# Patient Record
Sex: MALE | Race: OTHER | HISPANIC OR LATINO
[De-identification: names, ages, dates, MRNs, and addresses within clinical notes are randomized per-mention and may not be internally consistent; named-entity substitution may affect disease eponyms.]

---

## 2021-01-12 ENCOUNTER — TRANSCRIPTION ENCOUNTER (OUTPATIENT)
Age: 31
End: 2021-01-12

## 2021-02-09 ENCOUNTER — TRANSCRIPTION ENCOUNTER (OUTPATIENT)
Age: 31
End: 2021-02-09

## 2022-10-06 ENCOUNTER — APPOINTMENT (OUTPATIENT)
Dept: OTOLARYNGOLOGY | Facility: CLINIC | Age: 32
End: 2022-10-06

## 2022-10-06 VITALS — HEIGHT: 70 IN | BODY MASS INDEX: 26.48 KG/M2 | WEIGHT: 185 LBS

## 2022-10-06 DIAGNOSIS — J33.8 OTHER POLYP OF SINUS: ICD-10-CM

## 2022-10-06 DIAGNOSIS — H61.23 IMPACTED CERUMEN, BILATERAL: ICD-10-CM

## 2022-10-06 DIAGNOSIS — R09.81 NASAL CONGESTION: ICD-10-CM

## 2022-10-06 PROBLEM — Z00.00 ENCOUNTER FOR PREVENTIVE HEALTH EXAMINATION: Status: ACTIVE | Noted: 2022-10-06

## 2022-10-06 PROCEDURE — 99204 OFFICE O/P NEW MOD 45 MIN: CPT | Mod: 25

## 2022-10-06 PROCEDURE — 69210 REMOVE IMPACTED EAR WAX UNI: CPT

## 2022-10-06 PROCEDURE — 31231 NASAL ENDOSCOPY DX: CPT

## 2022-10-06 RX ORDER — MOMETASONE 50 UG/1
50 SPRAY, METERED NASAL DAILY
Qty: 3 | Refills: 3 | Status: ACTIVE | COMMUNITY
Start: 2022-10-06 | End: 1900-01-01

## 2022-10-06 RX ORDER — LEVOCETIRIZINE DIHYDROCHLORIDE 5 MG/1
5 TABLET ORAL DAILY
Qty: 1 | Refills: 3 | Status: ACTIVE | COMMUNITY
Start: 2022-10-06 | End: 1900-01-01

## 2022-10-06 NOTE — PROCEDURE
[Flexible Endoscope] : examined with the flexible endoscope [Congested] : congested [Lopez] : lopez [Normal] : the paranasal sinuses had no abnormalities [de-identified] : polyp [FreeTextEntry1] : turbinate hypertrophy

## 2022-10-06 NOTE — PHYSICAL EXAM
[Nasal Endoscopy Performed] : nasal endoscopy was performed, see procedure section for findings [] : septum deviated bilaterally [Normal] : mucosa is normal [Midline] : trachea located in midline position [de-identified] : ear cerumen  [de-identified] : edema

## 2022-10-06 NOTE — HISTORY OF PRESENT ILLNESS
[Snoring] : snoring [Witnessed Apneas] : witnessed sleep apnea [Frequent Nocturnal Awakening] : frequent nocturnal awakening [Daytime Somnolence] : daytime somnolence [Unintentional Sleep While Inactive] : unintentional sleep while inactive [Awakes Unrefreshed] : awakening unrefreshed [de-identified] : Patient presents today c/o clogged ears ,  snoring, nasal polyp . He has been  snoring at night noticed it is getting  worse. He does mouth  breathe during the night. Was told he does wake up gasping for  air. Not  using nay  nasal sprays. Had MRI performed  showed a  nasal polyp . No issues  breathing from nose.  Ear feel clogged with wax . [Awakes with Headache] : no headache upon awakening [Awakening With Dry Mouth] : no dry mouth upon awakening

## 2022-10-06 NOTE — REASON FOR VISIT
[Initial Evaluation] : an initial evaluation for [FreeTextEntry2] : clogged ears ,  snoring, nasal polyp

## 2022-12-08 ENCOUNTER — APPOINTMENT (OUTPATIENT)
Dept: OTOLARYNGOLOGY | Facility: CLINIC | Age: 32
End: 2022-12-08

## 2023-08-21 ENCOUNTER — EMERGENCY (EMERGENCY)
Facility: HOSPITAL | Age: 33
LOS: 0 days | Discharge: HOME | End: 2023-08-22
Attending: EMERGENCY MEDICINE
Payer: COMMERCIAL

## 2023-08-21 VITALS
SYSTOLIC BLOOD PRESSURE: 145 MMHG | TEMPERATURE: 98 F | OXYGEN SATURATION: 97 % | RESPIRATION RATE: 18 BRPM | HEIGHT: 70 IN | WEIGHT: 190.04 LBS | HEART RATE: 76 BPM | DIASTOLIC BLOOD PRESSURE: 69 MMHG

## 2023-08-21 DIAGNOSIS — N50.89 OTHER SPECIFIED DISORDERS OF THE MALE GENITAL ORGANS: ICD-10-CM

## 2023-08-21 DIAGNOSIS — N50.812 LEFT TESTICULAR PAIN: ICD-10-CM

## 2023-08-21 DIAGNOSIS — N45.3 EPIDIDYMO-ORCHITIS: ICD-10-CM

## 2023-08-21 LAB
APPEARANCE UR: ABNORMAL
BACTERIA # UR AUTO: NEGATIVE /HPF — SIGNIFICANT CHANGE UP
BILIRUB UR-MCNC: NEGATIVE — SIGNIFICANT CHANGE UP
CAST: 0 /LPF — SIGNIFICANT CHANGE UP (ref 0–4)
COLOR SPEC: YELLOW — SIGNIFICANT CHANGE UP
DIFF PNL FLD: NEGATIVE — SIGNIFICANT CHANGE UP
GLUCOSE UR QL: NEGATIVE MG/DL — SIGNIFICANT CHANGE UP
KETONES UR-MCNC: ABNORMAL MG/DL
LEUKOCYTE ESTERASE UR-ACNC: NEGATIVE — SIGNIFICANT CHANGE UP
NITRITE UR-MCNC: NEGATIVE — SIGNIFICANT CHANGE UP
PH UR: 7.5 — SIGNIFICANT CHANGE UP (ref 5–8)
PROT UR-MCNC: SIGNIFICANT CHANGE UP MG/DL
RBC CASTS # UR COMP ASSIST: 0 /HPF — SIGNIFICANT CHANGE UP (ref 0–4)
SP GR SPEC: 1.03 — SIGNIFICANT CHANGE UP (ref 1–1.03)
SQUAMOUS # UR AUTO: 0 /HPF — SIGNIFICANT CHANGE UP (ref 0–5)
UROBILINOGEN FLD QL: 1 MG/DL — SIGNIFICANT CHANGE UP (ref 0.2–1)
WBC UR QL: 0 /HPF — SIGNIFICANT CHANGE UP (ref 0–5)

## 2023-08-21 PROCEDURE — 99285 EMERGENCY DEPT VISIT HI MDM: CPT

## 2023-08-21 PROCEDURE — 76870 US EXAM SCROTUM: CPT

## 2023-08-21 PROCEDURE — 81001 URINALYSIS AUTO W/SCOPE: CPT

## 2023-08-21 PROCEDURE — 87086 URINE CULTURE/COLONY COUNT: CPT

## 2023-08-21 PROCEDURE — 99284 EMERGENCY DEPT VISIT MOD MDM: CPT | Mod: 25

## 2023-08-21 NOTE — ED PROVIDER NOTE - ATTENDING CONTRIBUTION TO CARE
32-year-old male planing of left testicular pain sharp intermittent.  Was being treated for UTI on Bactrim.  Ibuprofen is helping.  No fever no rectal pain no discharge sexually active with spouse.  No history of STDs.  Pain is nonradiating.    No acute distress abdomen soft nontender no lymphadenopathy left testicle larger than right mildly tender normal cremasterics normal lie no skin changes scrotal and penis are normal no hernia palpated.

## 2023-08-21 NOTE — ED ADULT NURSE NOTE - NSFALLUNIVINTERV_ED_ALL_ED
Bed/Stretcher in lowest position, wheels locked, appropriate side rails in place/Call bell, personal items and telephone in reach/Instruct patient to call for assistance before getting out of bed/chair/stretcher/Non-slip footwear applied when patient is off stretcher/[place] to call system/Physically safe environment - no spills, clutter or unnecessary equipment/Purposeful proactive rounding/Room/bathroom lighting operational, light cord in reach

## 2023-08-21 NOTE — ED PROVIDER NOTE - NSFOLLOWUPINSTRUCTIONS_ED_ALL_ED_FT
Our Emergency Department Referral Coordinators will be reaching out to you in the next 24-48 hours from 9:00am to 5:00pm with a follow up appointment. Please expect a phone call from the hospital in that time frame. If you do not receive a call or if you have any questions or concerns, you can reach them at   [phone]      Epididymitis  Epididymitis is where a tube (the epididymis) at the back of the testicles becomes swollen and painful. It's often caused by an infection and is usually treated with antibiotics.    If the testicles are also affected, it may be called epididymo-orchitis.    Check if it's epididymitis  Symptoms of epididymitis may include:    sudden or gradual pain in 1 or both of your testicles (balls)  the bag of skin containing your testicles (scrotum) feeling tender, warm and swollen  a build-up of fluid around your testicle (a hydrocele) that feels like a lump or swelling  You may have other symptoms depending on the cause, such as difficulty peeing, or a white, yellow or green discharge from the tip of the penis.    Causes of epididymitis  Epididymitis is usually caused by a sexually transmitted infection (STI), such as chlamydia or gonorrhoea. This is more likely in younger men under 35 years old.    If testing shows that your epididymitis is caused by a STI, it may be recommended that your current or recent sexual partners are also tested for STIs.    It can also be caused by a urinary tract infection (UTI), but UTIs are less common in men.    A UTI is more likely if you have:    an enlarged prostate gland  a urinary catheter  recently had surgery to the groin, prostate gland or bladder  Sometimes a cause cannot be found.    Less common causes of epididymitis  Non-urgent advice:See a GP if you have:  a lump in your testicles  swollen testicles  a change in the shape of your testicles  a change in the way your testicles feel  1 testicle that's become bigger than the other  aching or discomfort in your testicles that does not go away  Lumps in the testicles can be a sign of testicular cancer. This is easier to treat if it's found early.    Information:  Sexual health clinics can help with epididymitis  You can also get treatment for epididymitis at a sexual health clinic.    They can provide the same antibiotics you'd get at your GP surgery.    Many sexual health clinics also offer a walk-in service, where you do not need an appointment.

## 2023-08-21 NOTE — ED ADULT TRIAGE NOTE - CHIEF COMPLAINT QUOTE
pt sent by PMD to r/o testicular torsion. pt was being treated for pain and UTI last week but yesterday noticed swelling which has worsened today

## 2023-08-21 NOTE — ED PROVIDER NOTE - CARE PLAN
Assessment and plan of treatment:	Testicular pain    Imaging labs supportive care   1 Principal Discharge DX:	Epididymo-orchitis  Assessment and plan of treatment:	Testicular pain    Imaging labs supportive care

## 2023-08-21 NOTE — ED PROVIDER NOTE - PATIENT PORTAL LINK FT
You can access the FollowMyHealth Patient Portal offered by [facility] by registering at the following website: [email or web address]. By joining "[employer]"’s FollowMyHealth portal, you will also be able to view your health information using other applications (apps) compatible with our system.

## 2023-08-21 NOTE — ED PROVIDER NOTE - DIFFERENTIAL DIAGNOSIS
testicular inflamm vs infection, mass, hydrocele, varicocele, unlikely torsion Differential Diagnosis
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
## 2023-08-21 NOTE — ED ADULT NURSE NOTE - OBJECTIVE STATEMENT
pt sent by PMD to r/o testicular torsion. pt was being treated for pain and UTI last week but yesterday noticed swelling which has worsened today. Pt states being treated for UTI and was on bactrim. Pt states pain 10/10.

## 2023-08-22 PROCEDURE — 76870 US EXAM SCROTUM: CPT | Mod: 26

## 2023-08-22 RX ORDER — LEVOFLOXACIN 5 MG/ML
1 INJECTION, SOLUTION INTRAVENOUS
Qty: 7 | Refills: 0
Start: 2023-08-22 | End: 2023-08-28

## 2023-08-22 NOTE — CONSULT NOTE ADULT - ASSESSMENT
Pt is a 31yo Male without significant PMH/PSH who presents with left testicular pain.     PLAN:  Patient & imaging reviewed with Dr. Mcfarland, plan per attending:  -No acute urologic intervention at this point in time - unlikely bilateral intermittent torsion w/ reperfusion hyperemia, more likely resolving epididymoorchitis  -C/w abx - can continue Bactrim vs course of Levaquin  -NSAIDs, scrotal elevation  -Follow-up outpatient with Dr Mcfarland for reevaluation, possible repeat imaging  -Strict return precautions  -D/w ED team
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
## 2023-08-23 LAB
CULTURE RESULTS: NO GROWTH — SIGNIFICANT CHANGE UP
SPECIMEN SOURCE: SIGNIFICANT CHANGE UP

## 2023-10-13 ENCOUNTER — APPOINTMENT (OUTPATIENT)
Dept: UROLOGY | Facility: CLINIC | Age: 33
End: 2023-10-13